# Patient Record
Sex: MALE | Race: WHITE
[De-identification: names, ages, dates, MRNs, and addresses within clinical notes are randomized per-mention and may not be internally consistent; named-entity substitution may affect disease eponyms.]

---

## 2017-11-01 ENCOUNTER — HOSPITAL ENCOUNTER (OUTPATIENT)
Dept: HOSPITAL 39 - GMAM | Age: 67
End: 2017-11-01
Attending: FAMILY MEDICINE
Payer: SELF-PAY

## 2017-11-01 DIAGNOSIS — Z12.5: Primary | ICD-10-CM

## 2017-11-13 ENCOUNTER — HOSPITAL ENCOUNTER (OUTPATIENT)
Dept: HOSPITAL 39 - US | Age: 67
Discharge: HOME | End: 2017-11-13
Attending: FAMILY MEDICINE
Payer: MEDICARE

## 2017-11-13 DIAGNOSIS — J44.0: ICD-10-CM

## 2017-11-13 DIAGNOSIS — R91.1: ICD-10-CM

## 2017-11-13 DIAGNOSIS — E78.4: ICD-10-CM

## 2017-11-13 DIAGNOSIS — F17.200: ICD-10-CM

## 2017-11-13 DIAGNOSIS — K40.90: Primary | ICD-10-CM

## 2017-11-13 DIAGNOSIS — E11.9: ICD-10-CM

## 2017-11-13 DIAGNOSIS — I10: ICD-10-CM

## 2017-11-13 NOTE — US
EXAM DESCRIPTION:

Soft Tissue,Extremity



CLINICAL HISTORY:

67 years Male, LEFT INGUINAL HERNIA



COMPARISON:

None.



FINDINGS:

Sonographic evaluation of the left groin for possible herniated

demonstrates no convincing evidence of a fatty or bowel hernia in

the left inguinal or groin region. An elongated lymph node

approximately 1.4 cm in length and 5 to 6 mm in thickness with a

normal echogenic hilum is noted. Smaller lymph nodes are noted.

Vascular structures are normal.



IMPRESSION:

Upper normal inguinal lymph node on the left otherwise normal

appearance.



Electronically signed by:  Ramiro Aleman MD  11/13/2017 4:07 PM

[facility] Workstation: [id number]

## 2017-11-13 NOTE — CT
EXAM DESCRIPTION: 



Chest w/o Contrast



CLINICAL HISTORY: 



COPD



COMPARISON: 



December 22, 2016 and June 25, 2015



TECHNIQUE: 



Chest CT was performed without IV contrast.  This exam was

performed according to our departmental dose-optimization

program, which includes automated exposure control, adjustment of

the mA and/or kV according to patient size and/or use of

iterative reconstruction technique.



FINDINGS:



There is no thoracic aortic aneurysm. The main pulmonary artery

is not dilated. Calcified right hilar lymph nodes are noted.

Limited sensitivity for detection of adenopathy due to lack of IV

contrast, but no mediastinal or hilar adenopathy is seen. No

pleural or pericardial effusion. The central airways are clear.

Emphysematous changes are noted. There is no airspace

consolidation or lung mass. There is a calcified granuloma in the

right upper lobe there are several small noncalcified nodules in

both lung bases, all stable from December, 2016. The largest of

these measures 5 or 6 mm diameter. No new lung nodule. There are

multiple calcified splenic granulomata. There are likely a few

tiny calcified gallstones without pericholecystic inflammation or

apparent bladder wall thickening. No concerning bone lesion.



IMPRESSION: 



Emphysema with several small noncalcified nodules in both lungs

measuring up to 5 or 6 mm diameter. These all appear stable from

prior exams dating back to June 25, 2015. Considering the

evidence of old healed granulomatous disease elsewhere, these

also likely represent benign granulomata. Given two-year

stability, no further follow-up is recommended for these

findings.



Probable cholelithiasis without CT evidence of cholecystitis.



Electronically signed by:  Mark Valdez MD  11/13/2017 3:59 PM CST

Workstation: [id number]

## 2018-07-05 NOTE — RAD
Study: Frontal and Lateral Views of the Chest. 



Indication: pre op



Comparison: None.



Impression:



Heart size normal. Lungs clear. No acute osseous abnormality. 



Electronically signed by:  Mike Rodriguez MD  7/5/2018 12:26 PM CDT

Workstation: [id number]

## 2018-07-09 ENCOUNTER — HOSPITAL ENCOUNTER (OUTPATIENT)
Dept: HOSPITAL 39 - AMB | Age: 68
Discharge: HOME | End: 2018-07-09
Attending: SURGERY
Payer: MEDICARE

## 2018-07-09 VITALS — TEMPERATURE: 98.2 F | SYSTOLIC BLOOD PRESSURE: 130 MMHG | OXYGEN SATURATION: 98 % | DIASTOLIC BLOOD PRESSURE: 76 MMHG

## 2018-07-09 DIAGNOSIS — K82.8: ICD-10-CM

## 2018-07-09 DIAGNOSIS — Z79.4: ICD-10-CM

## 2018-07-09 DIAGNOSIS — K21.9: ICD-10-CM

## 2018-07-09 DIAGNOSIS — Z79.899: ICD-10-CM

## 2018-07-09 DIAGNOSIS — K80.10: Primary | ICD-10-CM

## 2018-07-09 DIAGNOSIS — E11.9: ICD-10-CM

## 2018-07-09 DIAGNOSIS — F17.210: ICD-10-CM

## 2018-07-09 DIAGNOSIS — Z88.8: ICD-10-CM

## 2018-07-09 DIAGNOSIS — J44.9: ICD-10-CM

## 2018-07-09 DIAGNOSIS — I10: ICD-10-CM

## 2018-07-09 PROCEDURE — 76000 FLUOROSCOPY <1 HR PHYS/QHP: CPT

## 2018-07-09 PROCEDURE — 82948 REAGENT STRIP/BLOOD GLUCOSE: CPT

## 2018-07-09 PROCEDURE — 47563 LAPARO CHOLECYSTECTOMY/GRAPH: CPT

## 2018-07-09 PROCEDURE — 00790 ANES IPER UPR ABD NOS: CPT

## 2018-07-09 PROCEDURE — 93005 ELECTROCARDIOGRAM TRACING: CPT

## 2018-07-09 PROCEDURE — 81001 URINALYSIS AUTO W/SCOPE: CPT

## 2018-07-09 PROCEDURE — 85025 COMPLETE CBC W/AUTO DIFF WBC: CPT

## 2018-07-09 PROCEDURE — 71046 X-RAY EXAM CHEST 2 VIEWS: CPT

## 2018-07-09 PROCEDURE — 80053 COMPREHEN METABOLIC PANEL: CPT

## 2018-07-09 PROCEDURE — 88304 TISSUE EXAM BY PATHOLOGIST: CPT

## 2018-07-09 PROCEDURE — 36416 COLLJ CAPILLARY BLOOD SPEC: CPT

## 2018-07-09 PROCEDURE — 36415 COLL VENOUS BLD VENIPUNCTURE: CPT

## 2018-07-09 RX ADMIN — HEPARIN SODIUM ONE UNITS: 10000 INJECTION, SOLUTION INTRAVENOUS; SUBCUTANEOUS at 09:34

## 2018-07-09 RX ADMIN — HYDROMORPHONE HYDROCHLORIDE ONE MG: 2 INJECTION, SOLUTION INTRAMUSCULAR; INTRAVENOUS; SUBCUTANEOUS at 10:30

## 2018-07-09 RX ADMIN — BUPIVACAINE HYDROCHLORIDE AND EPINEPHRINE BITARTRATE ONE ML: 2.5; .005 INJECTION, SOLUTION INFILTRATION; PERINEURAL at 09:15

## 2018-07-09 RX ADMIN — SODIUM CHLORIDE, POTASSIUM CHLORIDE, SODIUM LACTATE AND CALCIUM CHLORIDE ONE MLS: 600; 310; 30; 20 INJECTION, SOLUTION INTRAVENOUS at 11:40

## 2018-07-09 RX ADMIN — FENTANYL CITRATE ONE MCG: 50 INJECTION INTRAMUSCULAR; INTRAVENOUS at 10:55

## 2018-07-09 NOTE — OP
DATE OF PROCEDURE:  07/09/18



PREOPERATIVE DIAGNOSIS:

1.  Symptomatic cholelithiasis.



POSTOPERATIVE DIAGNOSIS:

1.  Symptomatic cholelithiasis.

2.  Chronic cholecystitis.



PROCEDURE:

1.  Laparoscopic cholecystectomy with intraoperative cholangiography.



SURGEON:  Donald A. Behr, MD.



ASSISTANT:  None.



ANESTHESIA:  Local infiltration of 0.25% Marcaine with epinephrine and general 
endotracheal anesthesia.



INDICATION:  The patient is a 67-year-old male who has had bloating, reflux 
symptoms, indigestion associated with fatty foods and sonographically diagnosed 
cholelithiasis.  The patient was brought to the Surgical Suite today for 
cholecystectomy after the risks, benefits and alternatives to the procedure 
were discussed and accepted.



FINDINGS:   Intraoperative cholangiography revealed free flow into the 
duodenum.  The bile duct was at the upper limits of normal, but there were no 
filling defects or strictures and it emptied well.  There were adhesions from 
the duodenum to the neck of the gallbladder.  No other pathology was identified.



DESCRIPTION OF PROCEDURE:  After adequate general endotracheal anesthesia was 
obtained, the patient was prepped in the usual sterile manner and then draped.  
Surgical time-out was taken.  The infraumbilical area was infiltrated with 
local anesthesia.  A curvilinear incision was fashioned with a #15 blade and 
dissection was carried down through the subcutaneous tissue using blunt 
dissection.  Traction sutures were placed on either side of the midline.  A 
small incision was made in the midline fascia and the peritoneum was opened 
bluntly.  Lexie trocar was introduced under direct vision into the abdominal 
cavity and fixed in place with the 20 mL balloon.  CO2 was then insufflated 
until a pressure of 12 mmHg was reached and the abdomen was tympanitic in all 
four quadrants.  When this was done, the laparoscope was introduced.  The 
abdomen was inspected with the previously noted findings.  The patient was then 
placed in reverse Trendelenburg position, turned to the left side.  The upper 
abdominal ports were placed under direct vision.  The gallbladder was grasped, 
retracted anteriorly and laterally.  The adhesions to the gallbladder were 
taken down using blunt dissection and electrocautery.  The neck of the 
gallbladder was retracted laterally.  The triangle of Calot was then explored 
with the cystic duct and cystic artery identified and isolated.  The cystic 
duct was hemoclipped once proximally.  The cystic artery was hemoclipped once 
distally.  A small incision was made in the cystic duct. The cholangiogram 
catheter was introduced through a separate stab wound in the right upper 
quadrant, introduced into the cystic duct and clipped in place. Cholangiograms 
were then taken using fluoroscopy which revealed free flow into the duodenum 
with no filling defects or strictures noted.  When this was done, the cystic 
duct catheter was removed.  The cystic duct was hemoclipped three times 
distally and divided between the hemoclips.  The cystic artery was clipped 
twice proximally and divided between the hemoclips.  The gallbladder was then 
dissected free from the gallbladder bed of the liver using electrocautery.  The 
gallbladder was removed from the infraumbilical port site in the usual manner 
under direct vision.  When this was done, the subhepatic space and subphrenic 
space were irrigated copiously with saline.  The effluent was noted to be 
clear.  The miranda hepatis was inspected and no bleeding or bile leak was 
identified.  The gallbladder bed of the liver revealed no bleeding.  The upper 
abdominal ports were removed and excellent hemostasis was noted. At this point, 
the CO2, the laparoscope and the infraumbilical port were removed.  The 
infraumbilical port site fascia was approximated with a single figure-of-eight 
suture of 0 Vicryl.  Subcutaneous tissue was irrigated with saline.  Skin edges 
were approximated with 4-0 Vicryl subcuticular sutures, benzoin and Steri-
Strips. Sterile dressings were applied.  The patient was awakened and taken to 
the Recovery Room in good and stable condition.  Estimated blood loss was less 
than 25 mL.  All sponge, needle and instrument counts were correct. 



#[id number]
[facility]

## 2018-10-02 ENCOUNTER — HOSPITAL ENCOUNTER (OUTPATIENT)
Dept: HOSPITAL 39 - GMAM | Age: 68
End: 2018-10-02
Attending: FAMILY MEDICINE
Payer: MEDICARE

## 2018-10-02 DIAGNOSIS — Z12.5: Primary | ICD-10-CM

## 2019-06-06 ENCOUNTER — HOSPITAL ENCOUNTER (OUTPATIENT)
Dept: HOSPITAL 39 - CT | Age: 69
End: 2019-06-06
Attending: FAMILY MEDICINE
Payer: MEDICARE

## 2019-06-06 DIAGNOSIS — Z87.891: Primary | ICD-10-CM

## 2019-06-06 DIAGNOSIS — R91.8: ICD-10-CM

## 2019-06-06 DIAGNOSIS — J98.11: ICD-10-CM

## 2019-06-07 NOTE — CT
Procedure:  CT LUNG SCREENING        



Exam Date:  6/6/2019.



Ordering Provider:  Ramiro Guzman



Clinical Indication:  PERSONAL HISTORY OF TOBACCO USE This

patient meets eligibility criteria for low-dose CT lung cancer

screening.



Comparison: CT scan of the thorax 11/13/2017.



Technique:  Using a multislice scanner, sequential helical axial

imaging was obtained in the thorax, 2.5 mm thickness, 2.5 mm

separation, from the level of the thoracic inlet through the lung

bases without IV contrast. A low dose protocol was utilized for

BMI less than 30: CTDI: 1.76 mGy.  120. kVp.  45 mA.  DLP: 72.29

mGy-centimeters. 2D sagittal and coronal reconstructed images,

6.0 mm thickness, were obtained. This exam was performed

according to our departmental dose optimization program which

includes use of automated exposure control, adjustment of the mA

and/or kV according to patient size and/or use of iterative

reconstruction technique.  Nodule measurements under 10 mm are

given as mean value of 3 axes diameters.



FINDINGS: 

Lungs and large airways: Emphysematous changes in the upper lobes

with small blebs in a centrilobular distribution. Partially solid

nodule in the subpleural lateral left lower lobe measuring 4 mm a

small extension to the pleura on axial series 2, image 105.

Stable since the prior study. Smaller nodule slightly inferior

and more posterior. 2 mm solid nodule more inferior also

subpleural on image 2/110. Smaller since the prior study.

Oval-shaped and bilobed 3 mm and 2 mm solid nodule more inferior

subpleural left lower lobe on image 2/116. No change since the

prior study. Small diffuse posterior groundglass density most

likely dependent atelectasis, and scarring, no change since the

prior study, Bilateral more density on the right posterior

subpleural nodule 4 mm right lower lobe on image 2/105. Stable

since the prior study. Bilobed solid nodule 5 mm in an 6 mm

subpleural right lower lobe on images 2/97-99. No change since

the prior study. Triangular-shaped 2 mm subpleural solid nodule

on image 2/89, stable since the prior study. Region of scarring

with dilated blebs in the right lower lobe on image 2/49 is

stable. Stable 2 mm solid nodule medial right apex on image 2/32

is unchanged since the prior study. Linear density abutting the

horizontal fissure on image 2/77 is stable. Also

triangular-shaped 4 mm nodule more superiorly and anteriorly in

the horizontal fissure on image 2/85 is unchanged since prior

study. No new nodules. No masses. No new focal infiltrates.



Pleura and space: Multiple bilateral areas of focal thickening.

No effusion or pneumothorax.



Mediastinum and mariela: evaluation limited by low dose technique

and lack of IV contrast.  No enlarging lymph nodes or soft tissue

masses.



Heart and great vessels: Coronary artery calcifications and

stents. Atherosclerotic calcifications in some of the included

brachiocephalic vessels, aortic arch.



Chest wall, lower neck, axillae: Evaluation also limited by same

factors as described above.  Heterogeneous thyroid gland. Study

also limited by paucity of fatty tissue in the chest wall.



Upper abdomen: No stranding, fluid, or free air in the included

peritoneal space. Numerous calcifications in the spleen with

atherosclerotic calcifications in the abdominal aorta. Surgical

clips in the gallbladder fossa. Images are not adequate for

evaluation of the adrenal glands due to low-dose technique.



Osseous structures: Evaluation limited by low dose MIP technique.

 No large lytic or blastic lesions are 2 bony changes. Minimal

spondylosis in the thoracic spine.



IMPRESSION:

Multiple nodules bilaterally, some associated with fissures and

some are nodular densities associated with atelectasis and

scarring in the bilateral bases more right than left. All nodules

are stable since the prior nonscreening study. No new nodules..

[facility] Best Practice recommendations: Please see below for

Lung RADS category and FOLLOW-UP.*



*Lung RADS category CATEGORY 2- Nodules with a very low

likelihood (less than 1%) of becoming a clinically active cancer

due to size or lack of growth. 



Nodules: Solid or part solid nodule(s) less than 6mm, new solid

nodule less than 4mm.  Ground glass nodule(s) less than 20mm or

unchanged or slow growing ground glass nodule 20mm or greater.

Cat 3 or 4 nodule unchanged for 3 or more months. 



FOLLOW-UP:  Continue annual screening with a Low Dose Chest CT in

12 months for re-evaluation. 



Electronically signed by:  Adam Villarreal MD  6/7/2019 8:46 AM CDT

Workstation: [id number]

## 2019-06-12 ENCOUNTER — HOSPITAL ENCOUNTER (OUTPATIENT)
Dept: HOSPITAL 39 - CT | Age: 69
End: 2019-06-12
Attending: FAMILY MEDICINE
Payer: MEDICARE

## 2019-06-12 DIAGNOSIS — R10.84: Primary | ICD-10-CM

## 2019-06-13 NOTE — CT
EXAM DESCRIPTION:  CT ABDOMEN AND PELVIS WITHOUT AND WITH

CONTRAST



CLINICAL HISTORY: Abdominal pain



COMPARISON: None Available.



TECHNIQUE: CT of the abdomen and pelvis are performed prior to

and during IV bolus administration of routine adult dose of

nonionic iodinated IV contrast. No oral contrast.



FINDINGS: 



CT abdomen



Lung bases are evaluated and multiple small pulmonary nodules are

seen. Patient has had multiple chest CTs, the most recent June 6, 2019. See previous CT chest report. Heart size is normal.

Liver is normal in size and parenchymal appearance on the

precontrast images. Gallbladder is surgically absent.

Multiple calcified granulomas in the spleen. Spleen, pancreas,

and kidneys are otherwise unremarkable.

Repeat helical scanning through the upper abdomen after IV

contrast shows normal enhancement of the liver and spleen.

Prominent common bile duct with no obstructing lesion or stone.

Moderate renal cortical thinning bilaterally. No hydronephrosis

or renal calculus. Delayed images show positive contrast in the

urinary collecting systems bilaterally. Minimal contrast in the

distal right ureter and in the urinary bladder. No filling

defects in the renal pelves or proximal ureters.



CT pelvis



No inflammation is seen around the cecum or terminal ileum or

sigmoid colon. Appendix is normal in appearance. No stones are

seen in the distal ureters or bladder. Normal pelvic small bowel

loops. Bilateral L5 spondylolysis with degenerative disc disease

in the lower L-spine. Postcontrast images show normal enhancement

of the pelvic vessels. Prostate and seminal vesicles appear

normal for age other than few coarse prostatic calcifications.

Prostate measures 4.4 cm in transverse dimension.



Coronal and sagittal reformatted images confirm the findings.



IMPRESSION:



No acute upper abdominal process.



No acute process in the pelvis.





This exam was performed according to our departmental

dose-optimization program, which includes automated exposure

control, adjustment of the mA and/or kV according to patient size

and/or use of iterative reconstruction technique.



Electronically signed by:  Randall Perry MD  6/13/2019 8:24

AM CDT Workstation: [id number]

## 2020-02-18 ENCOUNTER — HOSPITAL ENCOUNTER (OUTPATIENT)
Dept: HOSPITAL 39 - GMAM | Age: 70
End: 2020-02-18
Attending: FAMILY MEDICINE
Payer: COMMERCIAL

## 2020-02-18 DIAGNOSIS — E29.1: ICD-10-CM

## 2020-02-18 DIAGNOSIS — I10: ICD-10-CM

## 2020-02-18 DIAGNOSIS — Z12.5: Primary | ICD-10-CM

## 2020-02-18 DIAGNOSIS — E11.9: ICD-10-CM

## 2020-02-18 DIAGNOSIS — E55.9: ICD-10-CM

## 2020-02-18 PROCEDURE — 84403 ASSAY OF TOTAL TESTOSTERONE: CPT

## 2020-02-18 PROCEDURE — 82306 VITAMIN D 25 HYDROXY: CPT

## 2020-02-19 ENCOUNTER — HOSPITAL ENCOUNTER (OUTPATIENT)
Dept: HOSPITAL 39 - GMAM | Age: 70
End: 2020-02-19
Attending: FAMILY MEDICINE
Payer: COMMERCIAL

## 2020-02-19 DIAGNOSIS — J44.9: ICD-10-CM

## 2020-02-19 DIAGNOSIS — D64.9: Primary | ICD-10-CM

## 2020-12-09 ENCOUNTER — HOSPITAL ENCOUNTER (OUTPATIENT)
Dept: HOSPITAL 39 - GMAM | Age: 70
End: 2020-12-09
Attending: FAMILY MEDICINE
Payer: COMMERCIAL

## 2020-12-09 DIAGNOSIS — E78.49: ICD-10-CM

## 2020-12-09 DIAGNOSIS — I10: ICD-10-CM

## 2020-12-09 DIAGNOSIS — E11.9: ICD-10-CM

## 2020-12-09 DIAGNOSIS — E55.9: Primary | ICD-10-CM

## 2020-12-15 ENCOUNTER — HOSPITAL ENCOUNTER (OUTPATIENT)
Dept: HOSPITAL 39 - US | Age: 70
End: 2020-12-15
Attending: FAMILY MEDICINE
Payer: COMMERCIAL

## 2020-12-15 ENCOUNTER — HOSPITAL ENCOUNTER (OUTPATIENT)
Dept: HOSPITAL 39 - GMAM | Age: 70
End: 2020-12-15
Attending: FAMILY MEDICINE
Payer: COMMERCIAL

## 2020-12-15 DIAGNOSIS — D64.9: ICD-10-CM

## 2020-12-15 DIAGNOSIS — Z90.49: ICD-10-CM

## 2020-12-15 DIAGNOSIS — E53.8: ICD-10-CM

## 2020-12-15 DIAGNOSIS — R94.5: Primary | ICD-10-CM

## 2020-12-15 DIAGNOSIS — D64.9: Primary | ICD-10-CM

## 2020-12-16 NOTE — US
EXAM DESCRIPTION: 

Liver: ULTRASOUND.



CLINICAL HISTORY: 

anemia



COMPARISON: 

CT abdomen and pelvis 2019.



TECHNIQUE: 

Transabdominal scannin-dimensional and Doppler modes.



FINDINGS: 

Gallbladder: Surgically absent. No fluid in the gallbladder

fossa. Abdominal wall Non-tender with transducer pressure.



Common bile duct: caliber 11.5 mm is dilated.



Liver:  normal echogenicity; contour liver capsule smooth where

seen. No fluid around the liver. Intrahepatic biliary ducts

normal caliber.  Doppler hepatopedal flow portal vein. 10 mm

normal caliber.  Long axis right lobe 12.5 cm.



Pancreas: normal size and echogenicity.  Duct not seen. 



Right kidney: long axis measures 8.8 cm.  Volume 110.2 ml. 

Cortical echogenicity slightly increased but less than the

liver..  Cortical thickness is normal  no echogenic stones; no

hydronephrosis.



Aorta: Normal caliber from the proximal segment to the distal

bifurcation with minimal intimal wall irregularities.



IMPRESSION: 

1. Prior cholecystectomy. No fluid in the gallbladder fossa.

Nontender. Common bile duct dilated. No intrahepatic ductal

dilation. No pancreatic duct dilation. Consider MRCP if clinical

findings suggest biliary tract obstruction.

2. Liver and pancreas unremarkable.

3. Negative findings right kidney. Aorta normal caliber.



Electronically signed by:  Adam Villarreal MD  2020 8:19 AM

CST Workstation: [id number]

## 2021-01-17 ENCOUNTER — HOSPITAL ENCOUNTER (INPATIENT)
Dept: HOSPITAL 39 - ER | Age: 71
LOS: 5 days | Discharge: HOME | DRG: 440 | End: 2021-01-22
Attending: NURSE PRACTITIONER | Admitting: NURSE PRACTITIONER
Payer: COMMERCIAL

## 2021-01-17 DIAGNOSIS — Z79.899: ICD-10-CM

## 2021-01-17 DIAGNOSIS — R74.01: ICD-10-CM

## 2021-01-17 DIAGNOSIS — D50.9: ICD-10-CM

## 2021-01-17 DIAGNOSIS — F17.210: ICD-10-CM

## 2021-01-17 DIAGNOSIS — K85.20: Primary | ICD-10-CM

## 2021-01-17 DIAGNOSIS — Z79.891: ICD-10-CM

## 2021-01-17 DIAGNOSIS — E78.5: ICD-10-CM

## 2021-01-17 DIAGNOSIS — I10: ICD-10-CM

## 2021-01-17 DIAGNOSIS — F32.9: ICD-10-CM

## 2021-01-17 DIAGNOSIS — E11.9: ICD-10-CM

## 2021-01-17 DIAGNOSIS — Z90.49: ICD-10-CM

## 2021-01-17 DIAGNOSIS — Z79.4: ICD-10-CM

## 2021-01-17 DIAGNOSIS — F41.9: ICD-10-CM

## 2021-01-17 DIAGNOSIS — J44.9: ICD-10-CM

## 2021-01-17 PROCEDURE — B42H1ZZ COMPUTERIZED TOMOGRAPHY (CT SCAN) OF BILATERAL LOWER EXTREMITY ARTERIES USING LOW OSMOLAR CONTRAST: ICD-10-PCS

## 2021-01-17 PROCEDURE — B3201ZZ COMPUTERIZED TOMOGRAPHY (CT SCAN) OF THORACIC AORTA USING LOW OSMOLAR CONTRAST: ICD-10-PCS

## 2021-01-17 PROCEDURE — B32S1ZZ COMPUTERIZED TOMOGRAPHY (CT SCAN) OF RIGHT PULMONARY ARTERY USING LOW OSMOLAR CONTRAST: ICD-10-PCS

## 2021-01-17 PROCEDURE — B4211ZZ COMPUTERIZED TOMOGRAPHY (CT SCAN) OF CELIAC ARTERY USING LOW OSMOLAR CONTRAST: ICD-10-PCS

## 2021-01-17 PROCEDURE — B32T1ZZ COMPUTERIZED TOMOGRAPHY (CT SCAN) OF LEFT PULMONARY ARTERY USING LOW OSMOLAR CONTRAST: ICD-10-PCS

## 2021-01-17 PROCEDURE — B4281ZZ COMPUTERIZED TOMOGRAPHY (CT SCAN) OF BILATERAL RENAL ARTERIES USING LOW OSMOLAR CONTRAST: ICD-10-PCS

## 2021-01-17 PROCEDURE — B4241ZZ COMPUTERIZED TOMOGRAPHY (CT SCAN) OF SUPERIOR MESENTERIC ARTERY USING LOW OSMOLAR CONTRAST: ICD-10-PCS

## 2021-01-17 RX ADMIN — HYDROMORPHONE HYDROCHLORIDE PRN MG: 2 INJECTION, SOLUTION INTRAMUSCULAR; INTRAVENOUS; SUBCUTANEOUS at 16:06

## 2021-01-17 RX ADMIN — POTASSIUM CHLORIDE, DEXTROSE MONOHYDRATE AND SODIUM CHLORIDE PRN MLS/HR: 150; 5; 450 INJECTION, SOLUTION INTRAVENOUS at 20:14

## 2021-01-17 RX ADMIN — ENOXAPARIN SODIUM SCH MG: 40 INJECTION, SOLUTION INTRAVENOUS; SUBCUTANEOUS at 20:19

## 2021-01-17 RX ADMIN — INSULIN LISPRO SCH: 100 INJECTION, SOLUTION INTRAVENOUS; SUBCUTANEOUS at 18:26

## 2021-01-17 RX ADMIN — INSULIN LISPRO SCH: 100 INJECTION, SOLUTION INTRAVENOUS; SUBCUTANEOUS at 11:58

## 2021-01-17 RX ADMIN — HYDROMORPHONE HYDROCHLORIDE PRN MG: 2 INJECTION, SOLUTION INTRAMUSCULAR; INTRAVENOUS; SUBCUTANEOUS at 20:17

## 2021-01-17 RX ADMIN — POTASSIUM CHLORIDE, DEXTROSE MONOHYDRATE AND SODIUM CHLORIDE PRN MLS/HR: 150; 5; 450 INJECTION, SOLUTION INTRAVENOUS at 10:15

## 2021-01-17 RX ADMIN — MORPHINE SULFATE PRN MG: 10 INJECTION INTRAVENOUS at 10:19

## 2021-01-17 RX ADMIN — ONDANSETRON PRN MG: 2 INJECTION INTRAMUSCULAR; INTRAVENOUS at 20:15

## 2021-01-17 NOTE — ED.PDOC
History of Present Illness





- General


Time Seen by Provider: 01/17/21 03:52


Source: patient, RN notes reviewed, Vital Signs reviewed


Exam Limitations: no limitations





- History of Present Illness


Initial Comments: 





Pt is a 69 yo male with PMH of COPD, chronic back pain and DM.  Reports onset of

sharp lower abdominal pain that is constant that began at 2300 tonight.  Pain 

occasionally radiates to chest and back.  He has taken Tums and Prilosec tonight

w/o relief.  Denies SOB, nausea, vomiting, diarrhea, fever or dysuria.  Denies 

any recent injury or trauma


Allergies/Adverse Reactions: 


Allergies





NO KNOWN ALLERGY Allergy (Verified 01/17/21 04:07)


   





Home Medications: 


Ambulatory Orders





Bupropion HCl [Wellbutrin Xl] 150 mg PO 07/05/18 


HYDROcodone 10MG/APAP 325MG [Norco 10/325] 10 PO PRN PRN 07/05/18 


Insulin Glargine [Lantus Solostar] 12 unit SC BEDTIME 07/05/18 


Multiple Vitamins W/ Minerals [Centrum Silver] 1 tab PO DAILY 07/05/18 


Quinapril HCl [Quinapril Hydrochloride] PO DAILY 07/05/18 


Sitagliptin-Metformin HCl [Janumet] 50 mg PO BID 07/05/18 


Tiotropium Bromide-Olodaterol [Stiolto Respimat 2.5-2.5 Mcg/Act] 2 PO DAILY 

07/05/18 


Zolpidem Tartrate [Ambien] 2.5 mg PO BEDTIME 07/05/18 











Review of Systems





- Review of Systems


Constitutional: Denies: chills, fever


Respiratory: Denies: cough, short of breath


Cardiology: States: chest pain.  Denies: edema, palpitations, syncope


Gastrointestinal/Abdominal: States: abdominal pain.  Denies: diarrhea, nausea, 

vomiting


Genitourinary: Denies: dysuria, frequency


Hematologic/Lymphatic: States: no symptoms reported


All other Systems: Reviewed and Negative





Past Medical History (General)





- Patient Medical History


Hx Congestive Heart Failure: No


Hx Diabetes: Yes - FSBS- 190 IN PACU


Hx MRSA: No





Family Medical History





- Family History


  ** Mother


Family History: Unknown





Physical Exam





- Physical Exam


General Appearance: Alert, Comfortable, No apparent distress


Neck: full range of motion, supple


Respiratory: chest non-tender, lungs clear, normal breath sounds, no respiratory

 distress


Cardiovascular/Chest: regular rate, rhythm, no edema, no murmur


Gastrointestinal/Abdominal: other - soft.  TTP diffusely.  No guarding or 

rigidity


Back Exam: no CVA tenderness, no vertebral tenderness


Extremity: normal range of motion, non-tender, normal inspection, no pedal edema


Neurologic: no motor/sensory deficits, alert, normal mood/affect


Skin Exam: normal color, warm/dry





Progress





- Progress


Progress: 





01/17/21 06:27


D/W Nathan Magdaleno, hospitalist, will admit.  NPO, IVF.





Pt presented with abd pain radiating to chest and back.  CTA shows no sign of 

dissection or aneurysm.  Lipase is 566.  Signs of possible pancreatitis on CT.  

Given Morphine for pain with no improvement.  After dilaudid, he does appear 

more comfortable.  Pt agrees wioth admission for further treatment.





- Results/Orders


Results/Orders: 





EKG- NSR, rate 99, nml intervals, no ST abnormality








CTA CHEST/ABD/PELVIS


IMPRESSION: 1. No evidence of aortic aneurysm or dissection. 2. Free fluid main

ly localized within the right upper quadrant. The etiology of this is uncertain 

and may be secondary to hepatitis pancreatitis, or possibly duodenitis. 

Recommend correlation with patient's laboratory values and history. 3. 18 mm 

right solid pulmonary nodule within the upper lobe. Consider a non-contrast 

Chest CT at 3 months, a PET/CT, or tissue sampling.





                                        





01/17/21 03:56


IV Care:Saline Lock per Protoc QSHIFT 


Sodium Chloride 0.9% (Flush) [Saline Flush Syringe]   10 ml IV PRN PRN 





01/17/21 04:00


EKG STAT 








                         Laboratory Results - last 24 hr











  01/17/21 01/17/21 01/17/21





  04:15 04:15 04:15


 


WBC  9.6  


 


RBC  4.70  


 


Hgb  11.5 L  


 


Hct  37.1 L  


 


MCV  79.0 L  


 


MCH  24.6 L  


 


MCHC  31.1 L  


 


RDW  25.0 H  


 


Plt Count  188  


 


MPV  8.6  


 


Absolute Neuts (auto)  7.70 H  


 


Absolute Lymphs (auto)  1.10  


 


Absolute Monos (auto)  0.60  


 


Absolute Eos (auto)  0.10  


 


Absolute Basos (auto)  0.00  


 


Neutrophils %  80.5 H  


 


Lymphocytes %  11.8 L  


 


Monocytes %  6.2  


 


Eosinophils %  1.2  


 


Basophils %  0.3  


 


Normal RBC Morphology  Stain quality accept  


 


Sodium   140 


 


Potassium   3.9 


 


Chloride   104 


 


Carbon Dioxide   22 


 


Anion Gap   17.9 


 


BUN   14 


 


Creatinine   1.05 


 


BUN/Creatinine Ratio   13.3 


 


Random Glucose   86 


 


Serum Osmolality   279.2 


 


Calcium   9.0 


 


Total Bilirubin   0.4 


 


AST   35 


 


ALT   28 


 


Alkaline Phosphatase   141 H 


 


Troponin I    < 0.02


 


Serum Total Protein   6.9 


 


Albumin   4.2 


 


Globulin   2.7 


 


Albumin/Globulin Ratio   1.6 


 


Lipase   566 H 


 


Urine Color   


 


Urine Appearance   


 


Urine pH   


 


Ur Specific Gravity   


 


Urine Protein   


 


Urine Glucose (UA)   


 


Urine Ketones   


 


Urine Blood   


 


Urine Nitrite   


 


Urine Bilirubin   


 


Urine Urobilinogen   


 


Ur Leukocyte Esterase   


 


Urine RBC   


 


Urine WBC   


 


Ur Epithelial Cells   


 


Urine Bacteria   














  01/17/21





  04:15


 


WBC 


 


RBC 


 


Hgb 


 


Hct 


 


MCV 


 


MCH 


 


MCHC 


 


RDW 


 


Plt Count 


 


MPV 


 


Absolute Neuts (auto) 


 


Absolute Lymphs (auto) 


 


Absolute Monos (auto) 


 


Absolute Eos (auto) 


 


Absolute Basos (auto) 


 


Neutrophils % 


 


Lymphocytes % 


 


Monocytes % 


 


Eosinophils % 


 


Basophils % 


 


Normal RBC Morphology 


 


Sodium 


 


Potassium 


 


Chloride 


 


Carbon Dioxide 


 


Anion Gap 


 


BUN 


 


Creatinine 


 


BUN/Creatinine Ratio 


 


Random Glucose 


 


Serum Osmolality 


 


Calcium 


 


Total Bilirubin 


 


AST 


 


ALT 


 


Alkaline Phosphatase 


 


Troponin I 


 


Serum Total Protein 


 


Albumin 


 


Globulin 


 


Albumin/Globulin Ratio 


 


Lipase 


 


Urine Color  Yellow


 


Urine Appearance  Clear


 


Urine pH  5.5


 


Ur Specific Gravity  1.020


 


Urine Protein  Negative


 


Urine Glucose (UA)  500 H


 


Urine Ketones  Negative


 


Urine Blood  Negative


 


Urine Nitrite  Negative


 


Urine Bilirubin  Negative


 


Urine Urobilinogen  0.2


 


Ur Leukocyte Esterase  Negative


 


Urine RBC  0


 


Urine WBC  0


 


Ur Epithelial Cells  0


 


Urine Bacteria  0














Departure





- Departure


Clinical Impression: 


 Pulmonary nodule





Acute pancreatitis


Qualifiers:


 Pancreatitis type: unspecified pancreatitis type Acute pancreatitis 

complication: unspecified Qualified Code(s): K85.90 - Acute pancreatitis without

necrosis or infection, unspecified





Abdominal pain


Qualifiers:


 Abdominal location: generalized Qualified Code(s): R10.84 - Generalized 

abdominal pain





Time of Disposition: 06:30


Disposition: Admit Patient


Condition: Fair


Referrals: 


Ramiro Guzman MD [Primary Care Provider] - 1-2 Weeks


Home Medications: 


Ambulatory Orders





Bupropion HCl [Wellbutrin Xl] 150 mg PO 07/05/18 


HYDROcodone 10MG/APAP 325MG [Norco 10/325] 10 PO PRN PRN 07/05/18 


Insulin Glargine [Lantus Solostar] 12 unit SC BEDTIME 07/05/18 


Multiple Vitamins W/ Minerals [Centrum Silver] 1 tab PO DAILY 07/05/18 


Quinapril HCl [Quinapril Hydrochloride] PO DAILY 07/05/18 


Sitagliptin-Metformin HCl [Janumet] 50 mg PO BID 07/05/18 


Tiotropium Bromide-Olodaterol [Stiolto Respimat 2.5-2.5 Mcg/Act] 2 PO DAILY 

07/05/18 


Zolpidem Tartrate [Ambien] 2.5 mg PO BEDTIME 07/05/18

## 2021-01-17 NOTE — CT
EXAM:

  CT Angiography Chest, Abdomen and Pelvis With Intravenous

Contrast



CLINICAL HISTORY:

  The patient is 70 years old and is Male; dissection protocol



TECHNIQUE:

  Axial computed tomographic angiography images of the chest,

abdomen and pelvis with intravenous contrast.  Sagittal and

coronal reformatted images were created and reviewed.  This CT

exam was performed using one or more of the following dose

reduction techniques:  automated exposure control, adjustment of

the mA and/or kV according to patient size, and/or use of

iterative reconstruction technique.

  MIP reconstructed images were created and reviewed.



COMPARISON:

  CT of the chest June 6, 2019



FINDINGS:

  ARTIFACTS:  The exam is suboptimal secondary to motion

artifact.



 VASCULATURE:

  AORTA:  Atherosclerosis of the vasculature is present.  No

aortic aneurysm.  No dissection.

  PULMONARY ARTERIES:  Unremarkable as visualized.  No pulmonary

embolism is identified.

  GREAT VESSELS OF AORTIC ARCH:  No acute findings.  No

dissection.  No arterial occlusion or significant stenosis.

  CELIAC TRUNK AND MESENTERIC ARTERIES:  No acute findings.  No

occlusion or significant stenosis.

  RENAL ARTERIES:  No acute findings.  No occlusion or

significant stenosis.

  ILIAC ARTERIES:  No acute findings.  No occlusion or

significant stenosis.



 CHEST:

  LUNGS:  There has been interval development of a 1.8 cm right

upper lobe pulmonary nodule. Several smaller tree-in-bud nodular

densities within the right upper lobe are also noted. The lungs

are hyperinflated with bilateral emphysematous change. Minimal

dependent densities in the lung bases is noted.

  PLEURAL SPACE:  Unremarkable.  No significant effusion.  No

pneumothorax.

  HEART:  Unremarkable.  No cardiomegaly.  No significant

pericardial effusion.

  MEDIASTINUM:  The tracheobronchial tree is widely patent.



 ABDOMEN:

  LIVER:  Unremarkable.  No mass.

  GALLBLADDER AND BILE DUCTS:  Surgical clips are present in the

right upper quadrant, consistent with previous cholecystectomy. 

Moderate biliary dilatation is present, the common bile duct

measures up to 0.9 cm.

  PANCREAS:  The pancreas is atrophic.  No ductal dilation.

  SPLEEN:  Several splenic granuloma are present.

  ADRENALS:  Unremarkable.  No mass.

  KIDNEYS AND URETERS:  Unremarkable.  No hydronephrosis.  No

solid mass.

  STOMACH AND BOWEL:  The stomach is distended with food

contents. The small bowel is moderately distended. A moderate to

large amount of stool is present throughout colon. There is no

mucosal thickening or evidence of bowel obstruction.



 PELVIS:

  APPENDIX:  The appendix is normal in caliber without

surrounding inflammation.

  BLADDER:  The bladder is well distended.

  REPRODUCTIVE:  Unremarkable as visualized.



 CHEST, ABDOMEN and PELVIS:

  INTRAPERITONEAL SPACE:  Free fluid is present located within

the right upper quadrant.  No free air.

  BONES/JOINTS:  Bilateral pars defects at L5 are present with

grade 1 anterolisthesis of L5 on S1.  No acute fracture.  No

dislocation.

  SOFT TISSUES:  Unremarkable.

  LYMPH NODES:  Unremarkable.  No enlarged lymph nodes.



IMPRESSION:     

1.  No evidence of aortic aneurysm or dissection.

2.  Free fluid mainly localized within the right upper quadrant.

The etiology of this is uncertain and may be secondary to

hepatitis pancreatitis, or possibly duodenitis. Recommend

correlation with patient's laboratory values and history.

3.  18 mm right solid pulmonary nodule within the upper lobe.

Consider a non-contrast Chest CT at 3 months, a PET/CT, or tissue

sampling.

These guidelines do not apply to immunocompromised patients and

patients with cancer. Follow up in patients with significant

comorbidities as clinically warranted. For lung cancer screening,

adhere to Lung-RADS guidelines. Reference: Radiology. 2017;

284(1):228-43.



Electronically signed by:  Angeli Larry MD  1/17/2021 6:11 AM

[facility] Workstation: [id number]

## 2021-01-17 NOTE — HP
SUPERVISING PHYSICIAN:  Ramiro Guzman M.D.



CHIEF COMPLAINT:  Severe left upper quadrant pain.



HISTORY OF PRESENT ILLNESS:  Mr. Mccray is a 70 year-old  male 
patient who has a past medical history of chronic obstructive pulmonary disease,
chronic tobacco abuse with diabetes mellitus on both oral and insulin.  He 
presented to the Emergency Room this morning complaining of sudden onset of left
sided abdominal pain.  He noted that he had supper last night and then shortly 
after developed some abdominal pains.  He denied any actual nausea, vomiting, 
diarrhea or any bowel changes.  He tried some Prilosec and Tums for relief but 
did not receive any and at that point reported to the Emergency Room  for 
evaluation.  He does have a history of cholecystectomy in 2018 that was 
uncomplicated.  His last reported colonoscopy was in .  Laboratory studies 
showed that he had a normal white count of 9,600.  He was showing a 
microcytic/hypochromic anemia at 11.5 and 37.1, normal platelet count at 
180,000.  Chemistries were all within normal limits, all but lipase which was 
elevated at 566.  He denies that he has any significant alcohol usage which 
typically is just 1 or 2 beers every week or so and has not had a significant 
intake of alcohol within the last 24 hours reportedly.  He was given Dilaudid 
for pain control, made NPO and is now going to be admitted for acute 
pancreatitis after CT findings of abdominal CTA and chest CTA showed some free 
fluid within the right upper quadrant but no free air.  The actual pancreas was 
noted to be atrophic, but no ductal dilation.  No other significant findings 
were noted on the CT initially.  Of note though is that the free fluid was 
mainly located within the upper right quadrant.  He is also going to have a 
surgical consultation after admission with Dr. Sow.  He was placed in 
observation in stable condition. 



PAST MEDICAL HISTORY: 

1.   Hypertension.

2.   Diabetes mellitus type 2 on both oral and insulin therapy.

3.   Chronic obstructive pulmonary disease in a current smoker.

4.   Hyperlipidemia.

5.   Depression and anxiety on Wellbutrin.



PAST SURGICAL HISTORY:

1.   Hernia repair at age 47, uncomplicated.

2.   Cholecystectomy laparoscopic in 2018 by Dr. Behr, uncomplicated.

3.   Last colonoscopy was reported in .



HOME MEDICATIONS:

1.   Ambien 2.5 mg at bedtime.

2.   Stiolto Respimat 2.5-2.5 mcg per actuation, 2 puffs daily.

3.   Janumet 50 mg b.i.d.

4.   Lantus Solostar 10 units at bedtime.

5.   Wellbutrin  mg daily.

6.   Ropinirole 5 mg at bedtime.

7.   Quinapril 10 mg daily.

8.   Multivitamins.

9.   P.r.n. Hydrocodone 10/325.



ALLERGIES:  



FAMILY HISTORY:  Father is  at age 52 secondary to kidney failure.  
Mother  age 3 secondary to lymphoma cancer.  He has 1 sister that has 
emphysema and type 2 diabetes.  He has 2 sons, one is  due to self 
inflicted gunshot wound.  He has 1 daughter who is healthy.



SOCIAL HISTORY:  The patient is .  Lives in [place], [place].  He is 
retired.  He currently smokes approximately a pack a day and has for greater 
than 30 years.  He drinks alcohol on a very infrequent occasion.  Does not have 
a history of illicit drug use.



REVIEW OF SYSTEMS: 

CONSTITUTIONAL:  Denies any fevers, chills, general malaise, body aches or 
unintentional weight loss or weight gain.

HEENT:  Denies any headaches, vision changes, sore throat, nasal congestion or 
ear aches.

RESPIRATORY:  Denies any coughing, wheezing or shortness of breath.

CARDIOVASCULAR:  Denies any chest pains, palpitations or syncopal episodes.  

GASTROINTESTINAL:  As noted in History of Present Illness, acute onset of 
abdominal pain.  Denies any actual nausea, vomiting, diarrhea or constipation.

GENITOURINARY:   Denies any dysuria, hematuria, polyuria.  

NEUROLOGIC:  Denies any headaches, vision changes, syncopal episodes, ataxia or 
other focal deficits.

HEMATOLOGIC:  Denies unexplained bleeding, bruising or transfusion reaction.



PHYSICAL EXAMINATION: 



VITAL SIGNS:  Showing to be afebrile at 98.8, pulse 98, blood pressure 179/94, 
respirations 18, satting 97% on room air.



GENERAL:  The patient does not look to be in any distress.  He looks a little 
uncomfortable.  He is sitting on the edge of the bed.  He is alert.



HEENT:  Tympanic membranes clear bilaterally.  Oropharynx is pink, moist without
any lesions.  



NECK: Supple, nontender with full range of motion.  No jugular venous 
distention.



CHEST:  Lung sounds are clear to auscultation bilaterally without any rhonchi, 
wheezes or rales.



HEART:  Regular rate and rhythm without any appreciable murmurs, gallops, or 
rubs.  



ABDOMEN:  Soft.  Diffusely tender to palpation.  No guarding.  No rigidity.  No 
peritoneal signs.  No point tenderness.



BACK:  No CVA or vertebral tenderness.



RECTAL:  Exam is deferred.

 

EXTREMITIES:  No cyanosis, clubbing or edema.  



NEUROLOGIC:  He is alert and oriented times three.  Cranial nerves II-XII are 
grossly intact.  



SKIN:  Warm, pink and dry.



LABORATORY:  White count 9,600, hemoglobin 11.5, hematocrit 37.1.  Differentia 
shows to be without a left shift.  RBC indices indicate microcytic/hypochromic 
anemia.  Platelet count is 188,000.  Chemistries are all within normal limits.  
Potassium normal at 3.9, blood sugar 86, calcium 9.0.   Liver functions all 
within normal limits.  The only abnormality noted was a lipase of 566.  Troponin
less than 0.02.  Urinalysis just showed 500 of glucose.



MICROBIOLOGY:  Swab for COVID was negative.



RADIOLOGY:  He had a CT of the chest and abdomen with the only finding the 
indication of no aortic aneurysm or dissection, but there was note of free fluid
mainly localized within the right upper quadrant.  Etiology is uncertain at this
point.  There is note of an 18 mm right solid pulmonary nodule within the upper 
lobe.  Otherwise no other acute findings.  Review of that does show that he has 
a fairly heavy burden of stool throughout his colon.



ASSESSMENT: 

1.   Acute abdominal pain with an elevated lipase with concerns for developing

      pancreatitis with surgical consultation pending.

2.   Microcytic/hypochromic anemia, etiology uncertain.

3.   Diabetes mellitus type 2 on both oral and insulin therapy.

4.   Hypertension.

5.   Chronic obstructive pulmonary disease in a current smoker.

6.   Chronic tobacco abuse.

7.   Hyperlipidemia.



PLAN:  Will go ahead and check his lipid panel today if it is available.  If 
not, will check one in the morning.  Possibly could be concern for 
hyperlipidemia resulting in his pancreatitis.  I have requested a surgical 
consultation with Dr. Sow.  This is pending.  He will be NPO on IV fluids.  
Pain management as needed with Dilaudid and morphine.  He will have Zofran for 
any nausea or Phenergan as needed for any vomiting.  He will be on every 6 hour 
finger sticks per insulin protocol.  He will be on DVT prophylaxis with Lovenox.
 Will recheck his labs in the morning.  I would anticipate his length of stay to
be at least 2 to 3 days.  He is currently in observation at this point.  Until 
we can transition to outpatient management will continue to monitor and treat as
needed.



#[id number]

[facility]

## 2021-01-18 RX ADMIN — POTASSIUM CHLORIDE, DEXTROSE MONOHYDRATE AND SODIUM CHLORIDE PRN MLS/HR: 150; 5; 450 INJECTION, SOLUTION INTRAVENOUS at 19:59

## 2021-01-18 RX ADMIN — INSULIN LISPRO SCH: 100 INJECTION, SOLUTION INTRAVENOUS; SUBCUTANEOUS at 18:04

## 2021-01-18 RX ADMIN — HYDROMORPHONE HYDROCHLORIDE PRN MG: 2 INJECTION, SOLUTION INTRAMUSCULAR; INTRAVENOUS; SUBCUTANEOUS at 12:17

## 2021-01-18 RX ADMIN — HYDROMORPHONE HYDROCHLORIDE PRN MG: 2 INJECTION, SOLUTION INTRAMUSCULAR; INTRAVENOUS; SUBCUTANEOUS at 00:35

## 2021-01-18 RX ADMIN — INSULIN LISPRO SCH UNITS: 100 INJECTION, SOLUTION INTRAVENOUS; SUBCUTANEOUS at 06:39

## 2021-01-18 RX ADMIN — INSULIN LISPRO SCH: 100 INJECTION, SOLUTION INTRAVENOUS; SUBCUTANEOUS at 12:49

## 2021-01-18 RX ADMIN — POTASSIUM CHLORIDE, DEXTROSE MONOHYDRATE AND SODIUM CHLORIDE PRN MLS/HR: 150; 5; 450 INJECTION, SOLUTION INTRAVENOUS at 12:16

## 2021-01-18 RX ADMIN — POTASSIUM CHLORIDE, DEXTROSE MONOHYDRATE AND SODIUM CHLORIDE PRN MLS/HR: 150; 5; 450 INJECTION, SOLUTION INTRAVENOUS at 04:16

## 2021-01-18 RX ADMIN — INSULIN LISPRO SCH: 100 INJECTION, SOLUTION INTRAVENOUS; SUBCUTANEOUS at 00:34

## 2021-01-18 RX ADMIN — HYDROMORPHONE HYDROCHLORIDE PRN MG: 2 INJECTION, SOLUTION INTRAMUSCULAR; INTRAVENOUS; SUBCUTANEOUS at 04:20

## 2021-01-18 RX ADMIN — HYDROMORPHONE HYDROCHLORIDE PRN MG: 2 INJECTION, SOLUTION INTRAMUSCULAR; INTRAVENOUS; SUBCUTANEOUS at 19:58

## 2021-01-18 RX ADMIN — HYDROMORPHONE HYDROCHLORIDE PRN MG: 2 INJECTION, SOLUTION INTRAMUSCULAR; INTRAVENOUS; SUBCUTANEOUS at 16:29

## 2021-01-18 RX ADMIN — HYDROMORPHONE HYDROCHLORIDE PRN MG: 2 INJECTION, SOLUTION INTRAMUSCULAR; INTRAVENOUS; SUBCUTANEOUS at 08:14

## 2021-01-18 RX ADMIN — ENOXAPARIN SODIUM SCH MG: 40 INJECTION, SOLUTION INTRAVENOUS; SUBCUTANEOUS at 20:02

## 2021-01-18 RX ADMIN — HYDROMORPHONE HYDROCHLORIDE PRN MG: 2 INJECTION, SOLUTION INTRAMUSCULAR; INTRAVENOUS; SUBCUTANEOUS at 23:09

## 2021-01-18 RX ADMIN — ONDANSETRON PRN MG: 2 INJECTION INTRAMUSCULAR; INTRAVENOUS at 04:29

## 2021-01-18 NOTE — US
EXAM DESCRIPTION: 

Abdomen,Complete: Ultrasound.



CLINICAL HISTORY: 

70 years Male acute pancreatitis



COMPARISON: 

CTA abdomen and chest on January 17.



TECHNIQUE: 

Transabdominal scanning: grayscale and Doppler modes.



FINDINGS: 

Gallbladder: Surgically absent. No fluid in the gallbladder

fossa. Abdominal wall Non-tender with transducer pressure.



Common bile duct: caliber 7.4 mm mildly dilated.



Liver:  normal echogenicity; contour liver capsule smooth where

seen. No fluid around the liver. Intrahepatic biliary ducts

normal caliber.  Doppler hepatopedal flow and normal caliber

portal vein 2 mm.  Long axis right lobe 13.1 cm.



Pancreas: normal size and echogenicity.  Duct not seen. 



Complete abdominal aorta: Normal caliber from the proximal

segment to the segment; distal segment to the bifurcation not

well seen.. 



IVC: visualized and normal caliber.



Right kidney: long axis measures 9.9 cm; volume 159.2 mL. 

Cortical echogenicity is increased slightly more than the liver. 

Increased cortical thickness abutting the liver..   No echogenic

stones and no hydronephrosis.



Left kidney: long axis measures 9.3 cm; volume 142.7 mL. 

Cortical echogenicity is increased but less than the liver.. 

Normal cortical thickness.   No echogenic stones; no

hydronephrosis.



Spleen:  Normal. No focal lesions..  11.2 cm long axis.



Other: None.



IMPRESSION: 

1. Prior cholecystectomy. No free fluid. Abdominal wall

nontender. Common bile duct mildly dilated even after

cholecystectomy. Pancreatic duct not dilated.

2. Liver and pancreas unremarkable. Normal appearance of the

spleen.

3. Anterior cortex of the right kidney appeared thickened but

normal appearance on the CT scan yesterday with perirenal fluid

present. Both kidneys with increased cortical echogenicity; right

kidney more echogenic than the liver. No stones or

hydronephrosis.

4. Normal caliber of the IVC and proximal and mid abdominal

aorta. Distal aorta obscured by intestinal gas.



Electronically signed by:  Adam Villarreal MD  1/18/2021 1:19 PM [facility]

Workstation: [id number]

## 2021-01-18 NOTE — PN
SUPERVISING PHYSICIAN:  Te Maldonado M.D.



DATE:  1/18/21



SUBJECTIVE:  The patient is still having a significant amount of abdominal pain.
 He is getting Dilaudid for pain control.  Dr. Behr is now seeing the patient as
he has seen the patient in the past.  He remains NPO.  He has not had any nausea
or vomiting.  No reported chest pains.



OBJECTIVE:

VITAL SIGNS:  Temperature 98.6, pulse 81, blood pressure 155/71, respirations 
17, satting 94% on room air.

GENERAL:  The patient looks to be resting comfortably, although he shows some 
grimacing when he does move and notes that his pain is fairly significant if he 
moves at any degree.  He is alert.

CHEST:  Remains clear to auscultation.

HEART:  Regular rate and rhythm.

ABDOMEN:   to palpation diffusely.  No significant rebound 
tenderness.  No point tenderness.  No peritoneal signs.  Bowel sounds are 
active.

EXTREMITIES:  Without edema.

NEUROLOGIC:  He is alert and oriented times three.



LABORATORY:  White count 8,100, hemoglobin 10.4, hematocrit 34.0, platelet count
114,000.  Differential does show a left shift.  Chemistries showing sodium 134, 
other electrolytes were within normal limits.  Creatinine is at 0.82, blood 
sugar ranged between 108 and 161, calcium 8.5, magnesium 1.9.  Liver enzymes are
now elevated with AST at 158,  and alkaline phosphatase 207.  Amylase is 
elevated at 856 with lipase down a little bit, down to 318.



RADIOLOGY:  Abdominal ultrasound per radiology interpretation shows no free 
fluid.  The abdominal wall was non-tender.  Common bile duct was mildly dilated 
even after cholecystectomy.  The pancreatic duct was not dilated.  The liver and
pancreas were fairly unremarkable.  Normal appearance of the spleen.  There is 
note of anterior cortex of the right kidney appeared thickened but normal 
appearance on the CT scan with perirenal fluid present.  Both kidneys have 
increased cortical echogenicity, right kidney more echogenic than liver and no 
stones or hydronephrosis.  IVC was noted of normal caliber.  Proximal and mid 
abdominal aorta as well.  Distal aorta is obscured by intestinal gas.



ASSESSMENT: 

1.   Abdominal pain with concerns for pancreatitis.

2.   Elevated transaminases, etiology uncertain.

3.   Microcytic/hypochromic anemia, likely chronic, showing to be stable.

4.   Diabetes mellitus type 2 on both oral and insulin therapy.

5.   Hypertension.

6.   Chronic obstructive pulmonary disease with no signs of exacerbation in a

      current smoker.

7.   Chronic tobacco abuse.

8.   Hyperlipidemia.



PLAN:  His lipid panel did not show any significant elevation of his 
triglycerides, with the triglycerides showing only to be elevated to 165.  He 
remains NPO and on fluids and pain management.  Dr. Behr now is seeing the 
patient.  Will continue with current plan and follow plan of care with Dr. Behr.
 Until we can transition him to outpatient management will continue to monitor 
and treat as needed.



#[id number]

[facility]

## 2021-01-18 NOTE — CONS
DATE OF CONSULTATION:  01/17/21



REASON FOR CONSULTATION:  Acute pancreatitis.



HISTORY OF PRESENT ILLNESS:  This 70-year-old man has had one day of abdominal 
pain, epigastric, very sharp, constant.  It began last night and radiates 
towards the back.  He took some Tums with no relief.  He had no nausea or 
vomiting with it, no history of jaundice, no fevers and no diarrhea.  No known 
sick contacts.  No spider bites, etc.



PAST MEDICAL HISTORY: 

1.  Diabetes.

2.  He is on a medication for his kidneys.



MEDICATIONS:  

1.  Bupropion.

2.  Hydrocodone.

3.  Insulin.

4.  Quinapril.

5.  Metformin.  

6.  Zolpidem.



ALLERGIES:  NONE.



SOCIAL HISTORY:  The patient rarely drinks alcohol, he has a history of smoking.
 



REVIEW OF SYSTEMS:  

GASTROINTESTINAL:  As above.  Otherwise, abdominal pain is still persistent, now
slightly better.  

GENITOURINARY:  No frequency, dysuria or hematuria.

EXTREMITIES:  No complaints.



PHYSICAL EXAMINATION: 



VITAL SIGNS:  Afebrile, heart rate in the 100s and 90s.  Oxygen saturation 94-
97% on room air.



GENERAL:  The patient is conscious, awake, alert and well-oriented, in no 
distress.  He is a thin man.



HEENT:  Normocephalic, atraumatic.  Pupils equal and reactive to light.  Sclerae
anicteric.  Oral mucosa is very dry at this time.  I will allow him to have sips
of water.



NECK:  Supple.  No masses or thyromegaly.



CHEST:  Clear.



HEART:  Regular.



ABDOMEN:  Soft with mild epigastric tenderness.



EXTREMITIES:  No edema.



NEUROLOGIC:  Normal.



LABORATORY: White count 9, hematocrit 37, platelet count 188.  CMP is normal but
of alkaline phosphatase of 141 and lipase 566.  Triglycerides slightly elevated.
 Bilirubin normal. Glucose 155 and 65.    



RADIOLOGY:  CT of chest essentially normal.  CT of the chest shows one small 
nodule in the lung.  Followup is recommended.  CT of the abdomen showed free 
fluid in the right upper quadrant.  Pancreas appeared atrophic.  Surgical clips 
consistent with cholecystectomy.



IMPRESSION:  Based on labs and symptoms, acute pancreatitis, however, his 
pancreas is atrophic.  We do not see a lot of inflammation.  He denied history 
of pancreatitis and does not admit to significant history of alcoholism.  There 
is some fluid in his abdomen.  He was diagnosed with acute pancreatitis.



RECOMMENDATION:  We discussed the usual course with him and possibility things 
could get worse and what we might see in that case, but for now I recommend just
liquids, continue NPO and observation.  Dr. Behr took out his gallbladder, so I 
will refer the patient to him in the morning.  



#[id number]



cc:  Ramiro Guzman MD

MTDD

## 2021-01-19 RX ADMIN — MORPHINE SULFATE PRN MG: 10 INJECTION INTRAVENOUS at 23:14

## 2021-01-19 RX ADMIN — HYDROMORPHONE HYDROCHLORIDE PRN MG: 2 INJECTION, SOLUTION INTRAMUSCULAR; INTRAVENOUS; SUBCUTANEOUS at 09:19

## 2021-01-19 RX ADMIN — INSULIN LISPRO SCH: 100 INJECTION, SOLUTION INTRAVENOUS; SUBCUTANEOUS at 18:16

## 2021-01-19 RX ADMIN — ONDANSETRON PRN MG: 2 INJECTION INTRAMUSCULAR; INTRAVENOUS at 04:10

## 2021-01-19 RX ADMIN — POTASSIUM CHLORIDE, DEXTROSE MONOHYDRATE AND SODIUM CHLORIDE PRN MLS/HR: 150; 5; 450 INJECTION, SOLUTION INTRAVENOUS at 04:05

## 2021-01-19 RX ADMIN — INSULIN LISPRO SCH: 100 INJECTION, SOLUTION INTRAVENOUS; SUBCUTANEOUS at 12:14

## 2021-01-19 RX ADMIN — MORPHINE SULFATE PRN MG: 10 INJECTION INTRAVENOUS at 04:08

## 2021-01-19 RX ADMIN — HYDROMORPHONE HYDROCHLORIDE PRN MG: 2 INJECTION, SOLUTION INTRAMUSCULAR; INTRAVENOUS; SUBCUTANEOUS at 15:45

## 2021-01-19 RX ADMIN — MORPHINE SULFATE PRN MG: 10 INJECTION INTRAVENOUS at 01:25

## 2021-01-19 RX ADMIN — HYDROMORPHONE HYDROCHLORIDE PRN MG: 2 INJECTION, SOLUTION INTRAMUSCULAR; INTRAVENOUS; SUBCUTANEOUS at 19:48

## 2021-01-19 RX ADMIN — ENOXAPARIN SODIUM SCH MG: 40 INJECTION, SOLUTION INTRAVENOUS; SUBCUTANEOUS at 20:11

## 2021-01-19 RX ADMIN — POTASSIUM CHLORIDE, DEXTROSE MONOHYDRATE AND SODIUM CHLORIDE PRN MLS/HR: 150; 5; 450 INJECTION, SOLUTION INTRAVENOUS at 12:11

## 2021-01-19 RX ADMIN — POTASSIUM CHLORIDE, DEXTROSE MONOHYDRATE AND SODIUM CHLORIDE PRN MLS/HR: 150; 5; 450 INJECTION, SOLUTION INTRAVENOUS at 19:50

## 2021-01-19 RX ADMIN — INSULIN LISPRO SCH: 100 INJECTION, SOLUTION INTRAVENOUS; SUBCUTANEOUS at 06:27

## 2021-01-19 RX ADMIN — INSULIN LISPRO SCH: 100 INJECTION, SOLUTION INTRAVENOUS; SUBCUTANEOUS at 00:09

## 2021-01-19 NOTE — PN
SUPERVISING PHYSICIAN:  Te Maldonado M.D.



DATE:  1/19/21



SUBJECTIVE:  The patient is still having about the same amount of pain as he had
in the last 24 hours, although he is ambulating fairly well.  He has not had any
nausea or vomiting.



OBJECTIVE:

VITAL SIGNS:  Temperature 99.1, pulse 111, blood pressure 148/81, respirations 
18, satting 97% on room air.

GENERAL:  The patient looks to be fairly comfortable, not in obvious distress.

CHEST:  Remains clear to auscultation.

HEART:  Regular rate and rhythm.

ABDOMEN:   to palpation diffusely.  No significant rebound 
tenderness.  No point tenderness.  No peritoneal signs.  Bowel sounds are 
active.

EXTREMITIES:  Without edema.

NEUROLOGIC:  He is alert and oriented times three.



LABORATORY:  Sodium 133 corrected to 132 for a glucose of 154.  Blood sugars 
range between 142 and 159.  Calcium is at 4.7, creatinine is 0.82.  Bilirubin 
did go up today to 2.1 from admission of 0.4 with an AST at 72 which is an 
improvement from yesterday at 158 as well as his ALT is at 72.  Alkaline 
phosphatase remains elevated at 260.  Amylase is down to 368, lipase is down to 
113. 



MICROBIOLOGY:  No new specimens.



RADIOLOGY:  No additional radiographic studies today.



ASSESSMENT: 

1.   Persistent abdominal pain with again concerns for pancreatitis, etiology 
uncertain

      at this point.

2.   Elevated transaminases, etiology uncertain.

3.   Microcytic/hypochromic anemia, likely chronic, showing to be stable.

4.   Diabetes mellitus type 2 on both oral and insulin therapy.

5.   Hypertension.

6.   Chronic obstructive pulmonary disease with no signs of exacerbation in a

      current smoker.

7.   Chronic tobacco abuse.

8.   Hyperlipidemia.



PLAN:  Dr. Behr continues to follow the patient's case as well as Dr. Roland, 
GI specialist from [facility], is going to see the patient in consultation 
tomorrow.  Still discussion on whether or not he needs to have an MRCP or 
whether or not he actually may have passed a stone, but will hold off until we 
get further guidance from Dr. Roland.  Until then will continue with fluids 
NPO and pain management.



#[id number]

[facility]

## 2021-01-20 RX ADMIN — HYDROMORPHONE HYDROCHLORIDE PRN MG: 2 INJECTION, SOLUTION INTRAMUSCULAR; INTRAVENOUS; SUBCUTANEOUS at 12:16

## 2021-01-20 RX ADMIN — INSULIN LISPRO SCH UNITS: 100 INJECTION, SOLUTION INTRAVENOUS; SUBCUTANEOUS at 00:22

## 2021-01-20 RX ADMIN — HYDROMORPHONE HYDROCHLORIDE PRN MG: 2 INJECTION, SOLUTION INTRAMUSCULAR; INTRAVENOUS; SUBCUTANEOUS at 03:46

## 2021-01-20 RX ADMIN — INSULIN LISPRO SCH: 100 INJECTION, SOLUTION INTRAVENOUS; SUBCUTANEOUS at 19:01

## 2021-01-20 RX ADMIN — POTASSIUM CHLORIDE, DEXTROSE MONOHYDRATE AND SODIUM CHLORIDE PRN MLS/HR: 150; 5; 450 INJECTION, SOLUTION INTRAVENOUS at 21:21

## 2021-01-20 RX ADMIN — INSULIN LISPRO SCH: 100 INJECTION, SOLUTION INTRAVENOUS; SUBCUTANEOUS at 12:08

## 2021-01-20 RX ADMIN — ENOXAPARIN SODIUM SCH MG: 40 INJECTION, SOLUTION INTRAVENOUS; SUBCUTANEOUS at 20:01

## 2021-01-20 RX ADMIN — POTASSIUM CHLORIDE, DEXTROSE MONOHYDRATE AND SODIUM CHLORIDE PRN MLS/HR: 150; 5; 450 INJECTION, SOLUTION INTRAVENOUS at 03:45

## 2021-01-20 RX ADMIN — HYDROMORPHONE HYDROCHLORIDE PRN MG: 2 INJECTION, SOLUTION INTRAMUSCULAR; INTRAVENOUS; SUBCUTANEOUS at 20:02

## 2021-01-20 RX ADMIN — INSULIN LISPRO SCH UNITS: 100 INJECTION, SOLUTION INTRAVENOUS; SUBCUTANEOUS at 06:24

## 2021-01-20 RX ADMIN — MORPHINE SULFATE PRN MG: 10 INJECTION INTRAVENOUS at 16:05

## 2021-01-20 RX ADMIN — MORPHINE SULFATE PRN MG: 10 INJECTION INTRAVENOUS at 08:23

## 2021-01-20 RX ADMIN — POTASSIUM CHLORIDE, DEXTROSE MONOHYDRATE AND SODIUM CHLORIDE PRN MLS/HR: 150; 5; 450 INJECTION, SOLUTION INTRAVENOUS at 13:22

## 2021-01-21 RX ADMIN — MORPHINE SULFATE PRN MG: 10 INJECTION INTRAVENOUS at 00:01

## 2021-01-21 RX ADMIN — ENOXAPARIN SODIUM SCH MG: 40 INJECTION, SOLUTION INTRAVENOUS; SUBCUTANEOUS at 20:40

## 2021-01-21 RX ADMIN — INSULIN LISPRO SCH UNITS: 100 INJECTION, SOLUTION INTRAVENOUS; SUBCUTANEOUS at 12:44

## 2021-01-21 RX ADMIN — POTASSIUM CHLORIDE, DEXTROSE MONOHYDRATE AND SODIUM CHLORIDE PRN MLS/HR: 150; 5; 450 INJECTION, SOLUTION INTRAVENOUS at 23:24

## 2021-01-21 RX ADMIN — POTASSIUM CHLORIDE, DEXTROSE MONOHYDRATE AND SODIUM CHLORIDE PRN MLS/HR: 150; 5; 450 INJECTION, SOLUTION INTRAVENOUS at 15:05

## 2021-01-21 RX ADMIN — INSULIN LISPRO SCH UNITS: 100 INJECTION, SOLUTION INTRAVENOUS; SUBCUTANEOUS at 18:07

## 2021-01-21 RX ADMIN — MORPHINE SULFATE PRN MG: 10 INJECTION INTRAVENOUS at 06:10

## 2021-01-21 RX ADMIN — HYDROMORPHONE HYDROCHLORIDE PRN MG: 2 INJECTION, SOLUTION INTRAMUSCULAR; INTRAVENOUS; SUBCUTANEOUS at 02:59

## 2021-01-21 RX ADMIN — POTASSIUM CHLORIDE, DEXTROSE MONOHYDRATE AND SODIUM CHLORIDE PRN MLS/HR: 150; 5; 450 INJECTION, SOLUTION INTRAVENOUS at 05:14

## 2021-01-21 RX ADMIN — HYDROCODONE BITARTRATE AND ACETAMINOPHEN PRN EA: 10; 325 TABLET ORAL at 11:49

## 2021-01-21 RX ADMIN — HYDROCODONE BITARTRATE AND ACETAMINOPHEN PRN EA: 10; 325 TABLET ORAL at 17:47

## 2021-01-21 RX ADMIN — INSULIN LISPRO SCH UNITS: 100 INJECTION, SOLUTION INTRAVENOUS; SUBCUTANEOUS at 06:22

## 2021-01-21 RX ADMIN — INSULIN LISPRO SCH: 100 INJECTION, SOLUTION INTRAVENOUS; SUBCUTANEOUS at 21:03

## 2021-01-21 RX ADMIN — NICOTINE SCH EA: 14 PATCH, EXTENDED RELEASE TRANSDERMAL at 22:07

## 2021-01-21 RX ADMIN — INSULIN LISPRO SCH UNITS: 100 INJECTION, SOLUTION INTRAVENOUS; SUBCUTANEOUS at 00:18

## 2021-01-21 NOTE — PN
SUPERVISING PHYSICIAN:  Te Maldonado MD



DATE:  01/20/21



SUBJECTIVE:  The patient is lying in bed.  We discussed his plan of care.  He 
spoke with Dr. Roland and Dr. Behr earlier.  We will slowly advance his diet. 
I cautioned him on drinking any alcoholic beverages in the future and he said he
would no longer drink any and had actually quit 10 to 15 years ago and only 
recently had 1 or 2 beers every once in a while.  He denies chest pain, nausea 
or vomiting.



OBJECTIVE:  

VITAL SIGNS:  Temperature 97.9, heart rate 102, blood pressure 147/83, 
respiratory rate 18, O2 saturation 96% on room air.

RESPIRATORY:  Essentially clear to auscultation bilaterally.  

CARDIAC: Regular rate and rhythm.  

GASTROINTESTINAL: Abdomen is soft, nondistended.  It is diffusely tender, but no
rebound tenderness or guarding.  Bowel sounds are positive.  

NEUROLOGIC: Awake, alert and oriented times three.  



LABORATORY:  Sodium 130, potassium 4.3, chloride 93, glucose 124, total 
bilirubin 2, alkaline phosphatase 327, amylase 183, lipase 47.  All other labs 
and films have been reviewed via the EMR.  



ASSESSMENT: 

1.   Persistent abdominal pain with current concerns for pancreatitis, etiology 
uncertain,

      but most likely secondary to ETOH consumption.

2.   Elevated transaminases, etiology uncertain, improved.

3.   Microcytic/hypochromic anemia, likely chronic, stable.

4.   Diabetes mellitus type 2 on both oral and insulin therapy.

5.   Hypertension.

6.   Chronic obstructive pulmonary disease with no signs of exacerbation in a 
current

      smoker.

7.   Chronic tobacco abuse.

8.   Hyperlipidemia.



PLAN:  We will continue present supportive care.  Dr. Behr and Dr. Roland both
recommend the patient can have a few ice chips this evening and tomorrow morning
will have a small clear liquid diet.  He will most likely need to remain on that
through tomorrow and then we will see how his clinical response is.  We will 
follow Dr. Behr's and Dr. Roland's recommendations for continued care.  I have
ordered lab for in the morning.  We will monitor and treat as needed.  



#[id number]

[facility]

## 2021-01-21 NOTE — PN
SUPERVISING PHYSICIAN:  Te Maldonado M.D.



DATE:  1/21/21



SUBJECTIVE:  The patient is lying in bed.  He has had his clear liquid diet 
today and has tolerated it without issues.  His pain is tolerable now.  He 
continues to feel better.  We discussed his discharge plan next week for 
followup with Dr. Guzman.



OBJECTIVE:

VITAL SIGNS:  Temperature 96.7, heart rate 97, blood pressure 136/75, 
respiratory rate 20, O2 saturation 97% on room air.

RESPIRATORY:  Essentially clear to auscultation bilaterally.

CARDIAC:  Regular rate and rhythm.

GASTROINTESTINAL:  Abdomen is soft, nondistended, non-tender.  Bowel sounds are 
positive.

NEUROLOGIC:  He is awake, alert and oriented times three.



LABORATORY:  WBCs are 7,600 with hemoglobin 10.4, hematocrit 32.7.  There is a 
left shift on his differential.  Blood sugars have run between 135 and 219.  
Sodium is slightly low at 132 with potassium 4, chloride 98, BUN 12, creatinine 
0.86, calcium 9.1.  Total bilirubin 2.3, alkaline phosphatase 341, amylase 133, 
lipase 40.  All other labs and films have been reviewed via the EMR.



ASSESSMENT: 

1.   Persistent abdominal pain with current concerns for pancreatitis, etiology 
uncertain,

      but most likely secondary to ETOH consumption.

2.   Elevated transaminases, etiology uncertain, improved.

3.   Microcytic/hypochromic anemia, likely chronic, stable.

4.   Diabetes mellitus type 2 on both oral and insulin therapy.

5.   Hypertension.

6.   Chronic obstructive pulmonary disease with no signs of exacerbation in a 
current

      smoker.

7.   Chronic tobacco abuse.

8.   Hyperlipidemia.



PLAN:  We will continue present supportive care and monitor his labs.  Dr. Behr 
has recommended that the patient stay on the clear liquid diet today and will 
advance his diet to a full liquid tomorrow.  Dr. Behr initially wanted an MRCP 
tomorrow if his bilirubin or his liver enzymes worsen, but that will be 
unavailable for tomorrow.  He will need close followup with Dr. Guzman next 
week.  He will need labs at his followup and if there is a substantial increase 
in his bilirubin or his liver enzymes, he is to be scheduled for an MRCP.  
Otherwise will continue with his present plan of care.  His diet will be 
advanced to a full liquid tomorrow.  He is going to oral pain medications.  I 
will discontinue his Dilaudid and will follow and treat as needed.



#[id number]

[facility]

## 2021-01-22 VITALS — OXYGEN SATURATION: 99 %

## 2021-01-22 VITALS — TEMPERATURE: 97.9 F | DIASTOLIC BLOOD PRESSURE: 78 MMHG | SYSTOLIC BLOOD PRESSURE: 154 MMHG

## 2021-01-22 RX ADMIN — HYDROCODONE BITARTRATE AND ACETAMINOPHEN PRN EA: 10; 325 TABLET ORAL at 13:21

## 2021-01-22 RX ADMIN — HYDROCODONE BITARTRATE AND ACETAMINOPHEN PRN EA: 10; 325 TABLET ORAL at 00:03

## 2021-01-22 RX ADMIN — INSULIN LISPRO SCH UNITS: 100 INJECTION, SOLUTION INTRAVENOUS; SUBCUTANEOUS at 12:04

## 2021-01-22 RX ADMIN — NICOTINE SCH EA: 14 PATCH, EXTENDED RELEASE TRANSDERMAL at 08:32

## 2021-01-22 RX ADMIN — INSULIN LISPRO SCH UNITS: 100 INJECTION, SOLUTION INTRAVENOUS; SUBCUTANEOUS at 07:30

## 2021-01-22 RX ADMIN — POTASSIUM CHLORIDE, DEXTROSE MONOHYDRATE AND SODIUM CHLORIDE PRN MLS/HR: 150; 5; 450 INJECTION, SOLUTION INTRAVENOUS at 05:45

## 2021-01-22 RX ADMIN — HYDROCODONE BITARTRATE AND ACETAMINOPHEN PRN EA: 10; 325 TABLET ORAL at 07:31

## 2021-01-26 ENCOUNTER — HOSPITAL ENCOUNTER (OUTPATIENT)
Dept: HOSPITAL 39 - GMAM | Age: 71
End: 2021-01-26
Attending: FAMILY MEDICINE
Payer: COMMERCIAL

## 2021-01-26 DIAGNOSIS — R74.01: Primary | ICD-10-CM

## 2021-01-28 NOTE — DS
SUPERVISING PHYSICIAN:  Ramiro Guzman M.D.



ADMISSION DIAGNOSES:

1.   Acute abdominal pain with an elevated lipase with concerns for developing

      pancreatitis with surgical consultation pending.

2.   Microcytic/hypochromic anemia, etiology uncertain.

3.   Diabetes mellitus type 2 on both oral and insulin therapy.

4.   Hypertension.

5.   Chronic obstructive pulmonary disease in a current smoker.

6.   Chronic tobacco abuse.

7.   Hyperlipidemia.



DISCHARGE DIAGNOSES:

1.   Persistent abdominal pain with current concerns for pancreatitis, etiology 
uncertain,

      but most likely secondary to ETOH consumption.

2.   Elevated transaminases, etiology uncertain, improved.

3.   Microcytic/hypochromic anemia, likely chronic, stable.

4.   Diabetes mellitus type 2 on both oral and insulin therapy.

5.   Hypertension.

6.   Chronic obstructive pulmonary disease with no signs of exacerbation in a 
current

      smoker.

7.   Chronic tobacco abuse.

8.   Hyperlipidemia.



REASON FOR HOSPITALIZATION:   Mr. Mccray is a 70 year-old  male 
patient who has a past medical history of chronic obstructive pulmonary disease,
chronic tobacco abuse with diabetes mellitus on both oral and insulin.  He 
presented to the Emergency Room this morning complaining of sudden onset of left
sided abdominal pain.  He noted that he had supper last night and then shortly 
after developed some abdominal pains.  He denied any actual nausea, vomiting, 
diarrhea or any bowel changes.  He tried some Prilosec and Tums for relief but 
did not receive any and at that point reported to the Emergency Room  for 
evaluation.  He does have a history of cholecystectomy in 2018 that was 
uncomplicated.  His last reported colonoscopy was in 2012.  Laboratory studies 
showed that he had a normal white count of 9,600.  He was showing a 
microcytic/hypochromic anemia at 11.5 and 37.1, normal platelet count at 
180,000.  Chemistries were all within normal limits, all but lipase which was 
elevated at 566.  He denies that he has any significant alcohol usage which 
typically is just 1 or 2 beers every week or so and has not had a significant 
intake of alcohol within the last 24 hours reportedly.  He was given Dilaudid 
for pain control, made NPO and is now going to be admitted for acute 
pancreatitis after CT findings of abdominal CTA and chest CTA showed some free 
fluid within the right upper quadrant but no free air.  The actual pancreas was 
noted to be atrophic, but no ductal dilation.  No other significant findings 
were noted on the CT initially.  Of note though is that the free fluid was 
mainly located within the upper right quadrant.  He is also going to have a 
surgical consultation after admission with Dr. Sow.  He was placed in 
observation in stable condition. 

 

LABORATORY:  White count on discharge 6,500, hemoglobin 9.8, hematocrit 30.9.  
Platelet count 125.000.  Differentia was still showing a left shift.  RBC 
indices indicate a microcytic hypochromic anemia.  Chemistries showed sodium of 
132 corrected to 134 for a glucose of 175.  Blood sugars ranging between 175 and
234.  Magnesium 1.7, bilirubin down to 1.1, AST and ALT had normalized and 
lipase had normalized to 40.  Urinalysis just showed 500 of glucose.



MICROBIOLOGY:  Nasal swab for COVID was negative. 



RADIOLOGY:  Abdominal CT and chest CTA prior to admission per radiology 
interpretation showed no aortic aneurysms, some free fluid localized in the 
right upper quadrant.  Etiology is uncertain, could be secondary to hepatitis 
versus pancreatitis versus duodenitis.  There was note of an 18 mm right solid 
pulmonary nodule in the upper lobe.  Consideration for a 3-month CT followup, 
noncontrast.  He also had an abdominal ultrasound and per radiology 
interpretation showed common bile duct mildly dilated after cholecystectomy, 
pancreatic duct was not dilated.  Liver and pancreas were unremarkable.  Normal 
appearance of the spleen.  Please see that report for details.  12-lead EKG on 
admission per radiology interpretation showed normal sinus at 99 with no ST or 
T-wave changes to indicate ischemia.  



MEDICAL CONSULTATIONS:

1.  Dr. Fink, surgical. Please see his report for details.  

2.  Dr. Roland, gastroenterologist.  Please see his note and Dr. Behr's note 
for details.



HOSPITAL COURSE:   Mr. Mccray was admitted for acute pancreatitis.  He was 
kept n.p.o. and on IV fluids for several days until he showed a decrease in his 
pain level lipase had normalized.  He was seen in consultation with Dr. Sow,
Dr. Behr has history with the patient and did some followup while in the 
hospital, we well as the patient was seen in consultation by Dr. Roland.  It 
was felt that his symptoms were probably secondary to acute pancreatitis due to 
acute alcohol consumption.  He did show good clinical improvement.  He was able 
to tolerate his diet without any pain.  He was ambulating without any issues.  
It was felt that he had clinically improved well enough to continue with 
outpatient management.



PLAN:  Mr. Mccray was discharged to followup with Dr. Guzman in 7 days or 
sooner if needed.  He was to resume his usual medications as tolerated, advance 
his diet as tolerated and increase his activities as tolerated.  No medications 
were prescribed on discharge.



CONDITION ON DISCHARGE:  Stable and improved.



DISPOSITION:  The patient is discharged home.



#[id number]

MTDD

## 2022-10-15 NOTE — CT
EXAM:

  CT Angiography Chest, Abdomen and Pelvis With Intravenous

Contrast



CLINICAL HISTORY:

  The patient is 70 years old and is Male; dissection protocol



TECHNIQUE:

  Axial computed tomographic angiography images of the chest,

abdomen and pelvis with intravenous contrast.  Sagittal and

coronal reformatted images were created and reviewed.  This CT

exam was performed using one or more of the following dose

reduction techniques:  automated exposure control, adjustment of

the mA and/or kV according to patient size, and/or use of

iterative reconstruction technique.

  MIP reconstructed images were created and reviewed.



COMPARISON:

  CT of the chest June 6, 2019



FINDINGS:

  ARTIFACTS:  The exam is suboptimal secondary to motion

artifact.



 VASCULATURE:

  AORTA:  Atherosclerosis of the vasculature is present.  No

aortic aneurysm.  No dissection.

  PULMONARY ARTERIES:  Unremarkable as visualized.  No pulmonary

embolism is identified.

  GREAT VESSELS OF AORTIC ARCH:  No acute findings.  No

dissection.  No arterial occlusion or significant stenosis.

  CELIAC TRUNK AND MESENTERIC ARTERIES:  No acute findings.  No

occlusion or significant stenosis.

  RENAL ARTERIES:  No acute findings.  No occlusion or

significant stenosis.

  ILIAC ARTERIES:  No acute findings.  No occlusion or

significant stenosis.



 CHEST:

  LUNGS:  There has been interval development of a 1.8 cm right

upper lobe pulmonary nodule. Several smaller tree-in-bud nodular

densities within the right upper lobe are also noted. The lungs

are hyperinflated with bilateral emphysematous change. Minimal

dependent densities in the lung bases is noted.

  PLEURAL SPACE:  Unremarkable.  No significant effusion.  No

pneumothorax.

  HEART:  Unremarkable.  No cardiomegaly.  No significant

pericardial effusion.

  MEDIASTINUM:  The tracheobronchial tree is widely patent.



 ABDOMEN:

  LIVER:  Unremarkable.  No mass.

  GALLBLADDER AND BILE DUCTS:  Surgical clips are present in the

right upper quadrant, consistent with previous cholecystectomy. 

Moderate biliary dilatation is present, the common bile duct

measures up to 0.9 cm.

  PANCREAS:  The pancreas is atrophic.  No ductal dilation.

  SPLEEN:  Several splenic granuloma are present.

  ADRENALS:  Unremarkable.  No mass.

  KIDNEYS AND URETERS:  Unremarkable.  No hydronephrosis.  No

solid mass.

  STOMACH AND BOWEL:  The stomach is distended with food

contents. The small bowel is moderately distended. A moderate to

large amount of stool is present throughout colon. There is no

mucosal thickening or evidence of bowel obstruction.



 PELVIS:

  APPENDIX:  The appendix is normal in caliber without

surrounding inflammation.

  BLADDER:  The bladder is well distended.

  REPRODUCTIVE:  Unremarkable as visualized.



 CHEST, ABDOMEN and PELVIS:

  INTRAPERITONEAL SPACE:  Free fluid is present located within

the right upper quadrant.  No free air.

  BONES/JOINTS:  Bilateral pars defects at L5 are present with

grade 1 anterolisthesis of L5 on S1.  No acute fracture.  No

dislocation.

  SOFT TISSUES:  Unremarkable.

  LYMPH NODES:  Unremarkable.  No enlarged lymph nodes.



IMPRESSION:     

1.  No evidence of aortic aneurysm or dissection.

2.  Free fluid mainly localized within the right upper quadrant.

The etiology of this is uncertain and may be secondary to

hepatitis pancreatitis, or possibly duodenitis. Recommend

correlation with patient's laboratory values and history.

3.  18 mm right solid pulmonary nodule within the upper lobe.

Consider a non-contrast Chest CT at 3 months, a PET/CT, or tissue

sampling.

These guidelines do not apply to immunocompromised patients and

patients with cancer. Follow up in patients with significant

comorbidities as clinically warranted. For lung cancer screening,

adhere to Lung-RADS guidelines. Reference: Radiology. 2017;

284(1):228-43.



Electronically signed by:  Angeli Larry MD  1/17/2021 6:11 AM

[facility] Workstation: [id number] Report given to receiving RN tello ,pts history, current condition and reason for admission discussed, safety concerns addressed and reviewed, pt currently in stable condition, IV flushes for patency and site shows no signs or symptoms of infiltrate, dressing is clean dry and intact, pt is aware of plan of care. Pt education deemed successful at time of report after patient demonstrates successful teach back for proficiency.